# Patient Record
Sex: FEMALE | Race: WHITE | NOT HISPANIC OR LATINO | URBAN - METROPOLITAN AREA
[De-identification: names, ages, dates, MRNs, and addresses within clinical notes are randomized per-mention and may not be internally consistent; named-entity substitution may affect disease eponyms.]

---

## 2024-12-04 ENCOUNTER — OFFICE VISIT (OUTPATIENT)
Dept: URGENT CARE | Age: 59
End: 2024-12-04
Payer: COMMERCIAL

## 2024-12-04 VITALS
RESPIRATION RATE: 18 BRPM | TEMPERATURE: 98.7 F | HEART RATE: 81 BPM | WEIGHT: 155 LBS | OXYGEN SATURATION: 99 % | DIASTOLIC BLOOD PRESSURE: 97 MMHG | SYSTOLIC BLOOD PRESSURE: 143 MMHG | BODY MASS INDEX: 28.52 KG/M2 | HEIGHT: 62 IN

## 2024-12-04 DIAGNOSIS — J32.9 SINOBRONCHITIS: Primary | ICD-10-CM

## 2024-12-04 DIAGNOSIS — J40 SINOBRONCHITIS: Primary | ICD-10-CM

## 2024-12-04 RX ORDER — AZITHROMYCIN 250 MG/1
TABLET, FILM COATED ORAL
Qty: 6 TABLET | Refills: 0 | Status: SHIPPED | OUTPATIENT
Start: 2024-12-04

## 2024-12-04 ASSESSMENT — PAIN SCALES - GENERAL: PAINLEVEL_OUTOF10: 0-NO PAIN

## 2024-12-04 ASSESSMENT — ENCOUNTER SYMPTOMS
EYES NEGATIVE: 1
COUGH: 1
PSYCHIATRIC NEGATIVE: 1
FATIGUE: 1
ALLERGIC/IMMUNOLOGIC NEGATIVE: 1
NEUROLOGICAL NEGATIVE: 1
ENDOCRINE NEGATIVE: 1
MYALGIAS: 1
CARDIOVASCULAR NEGATIVE: 1
VOICE CHANGE: 1
HEMATOLOGIC/LYMPHATIC NEGATIVE: 1
FEVER: 0
GASTROINTESTINAL NEGATIVE: 1

## 2024-12-04 NOTE — PROGRESS NOTES
Subjective   Patient ID: Kathleen Bernal is a 59 y.o. female. They present today with a chief complaint of Illness (Sick since Saturday with headache, lost her voice, and also coughing up phlegm).    History of Present Illness    History provided by:  Patient  Illness  Associated symptoms: congestion, cough, fatigue and myalgias    Associated symptoms: no fever      This is a 59 yr old female here for respiratory sxs. Fatigue, myalgias, sneezing, sore throat, laryngitis, HA, yellow nasal congestion and cough productive of yellow sputum x 5 days. No fever or dyspnea.     Past Medical History  Allergies as of 12/04/2024 - Reviewed 12/04/2024   Allergen Reaction Noted    Ciprofloxacin Unknown 03/25/2016    Hydrochlorothiazide Unknown 09/21/2023    Lisinopril Cough 09/14/2023    Losartan Unknown 09/21/2023    Metoprolol Unknown 09/28/2023    Amoxicillin Rash 08/30/2013    Amoxicillin-pot clavulanate Rash 08/30/2013    Nitrofurantoin monohyd/m-cryst Rash 03/17/2016       (Not in a hospital admission)       No past medical history on file.    No past surgical history on file.     reports that she has been smoking cigarettes. She does not have any smokeless tobacco history on file.    Review of Systems  Review of Systems   Constitutional:  Positive for fatigue. Negative for fever.   HENT:  Positive for congestion, sneezing and voice change.    Eyes: Negative.    Respiratory:  Positive for cough.    Cardiovascular: Negative.    Gastrointestinal: Negative.    Endocrine: Negative.    Genitourinary: Negative.    Musculoskeletal:  Positive for myalgias.   Skin: Negative.    Allergic/Immunologic: Negative.    Neurological: Negative.    Hematological: Negative.    Psychiatric/Behavioral: Negative.     All other systems reviewed and are negative.     Objective    Vitals:    12/04/24 1218   BP: (!) 143/97   Pulse: 81   Resp: 18   Temp: 37.1 °C (98.7 °F)   TempSrc: Oral   SpO2: 99%   Weight: 70.3 kg (155 lb)   Height: 1.575 m (5'  "2\")     No LMP recorded.    Physical Exam  Vitals and nursing note reviewed.   Constitutional:       Appearance: Normal appearance.   HENT:      Head: Normocephalic and atraumatic.      Right Ear: Tympanic membrane and ear canal normal.      Left Ear: Tympanic membrane and ear canal normal.      Mouth/Throat:      Mouth: Mucous membranes are moist.      Pharynx: Oropharynx is clear.   Cardiovascular:      Rate and Rhythm: Normal rate and regular rhythm.   Pulmonary:      Effort: Pulmonary effort is normal.      Breath sounds: Normal breath sounds.   Musculoskeletal:      Cervical back: Neck supple.   Lymphadenopathy:      Cervical: No cervical adenopathy.   Skin:     General: Skin is warm and dry.   Neurological:      General: No focal deficit present.      Mental Status: She is alert and oriented to person, place, and time.       Procedures    Point of Care Test & Imaging Results from this visit  No results found for this visit on 12/04/24.   No results found.    Diagnostic study results (if any) were reviewed by Sara Oconnell PA-C.    Assessment/Plan   Allergies, medications, history, and pertinent labs/EKGs/Imaging reviewed by Sara Oconnell PA-C.       Orders and Diagnoses  Diagnoses and all orders for this visit:  Sinobronchitis  -     azithromycin (Zithromax) 250 mg tablet; 2 po day 1, then 1 po days 2-5    Plan:  Tea with honey and humidified air helpful for laryngitis  Increase clear fluids  OTC cough/cold med as directed  Med as above  Pcp follow up this week if not improving or worsening  ER visit anytime 24/7 for acute worsening or changing condition    Patient disposition: Home    Electronically signed by Sara Oconnell PA-C  12:37 PM      "

## 2024-12-04 NOTE — PATIENT INSTRUCTIONS
Increase clear fluids  OTC cough/cold med as directed  Pcp follow up this week if not improving or worsening  ER visit anytime 24/7 for acute worsening or changing condition